# Patient Record
Sex: MALE | Race: WHITE | NOT HISPANIC OR LATINO | Employment: FULL TIME | URBAN - METROPOLITAN AREA
[De-identification: names, ages, dates, MRNs, and addresses within clinical notes are randomized per-mention and may not be internally consistent; named-entity substitution may affect disease eponyms.]

---

## 2023-11-22 ENCOUNTER — HOSPITAL ENCOUNTER (EMERGENCY)
Facility: HOSPITAL | Age: 38
Discharge: HOME | End: 2023-11-22
Attending: EMERGENCY MEDICINE | Admitting: EMERGENCY MEDICINE
Payer: COMMERCIAL

## 2023-11-22 VITALS
BODY MASS INDEX: 35.7 KG/M2 | HEART RATE: 112 BPM | RESPIRATION RATE: 16 BRPM | OXYGEN SATURATION: 97 % | HEIGHT: 71 IN | TEMPERATURE: 98 F | WEIGHT: 255 LBS | DIASTOLIC BLOOD PRESSURE: 141 MMHG | SYSTOLIC BLOOD PRESSURE: 201 MMHG

## 2023-11-22 DIAGNOSIS — I10 ELEVATED BLOOD PRESSURE READING WITH DIAGNOSIS OF HYPERTENSION: Primary | ICD-10-CM

## 2023-11-22 LAB
ALBUMIN SERPL-MCNC: 4.7 G/DL (ref 3.5–5.2)
ALBUMIN/GLOB SERPL: 1.3 G/DL
ALP SERPL-CCNC: 104 U/L (ref 39–117)
ALT SERPL W P-5'-P-CCNC: 203 U/L (ref 1–41)
ANION GAP SERPL CALCULATED.3IONS-SCNC: 11 MMOL/L (ref 5–15)
AST SERPL-CCNC: 96 U/L (ref 1–40)
BACTERIA UR QL AUTO: NORMAL /HPF
BASOPHILS # BLD AUTO: 0.12 10*3/MM3 (ref 0–0.2)
BASOPHILS NFR BLD AUTO: 1.2 % (ref 0–1.5)
BILIRUB SERPL-MCNC: 0.7 MG/DL (ref 0–1.2)
BILIRUB UR QL STRIP: NEGATIVE
BUN SERPL-MCNC: 10 MG/DL (ref 6–20)
BUN/CREAT SERPL: 11.1 (ref 7–25)
CALCIUM SPEC-SCNC: 9.5 MG/DL (ref 8.6–10.5)
CHLORIDE SERPL-SCNC: 99 MMOL/L (ref 98–107)
CLARITY UR: CLEAR
CO2 SERPL-SCNC: 27 MMOL/L (ref 22–29)
COLOR UR: YELLOW
CREAT SERPL-MCNC: 0.9 MG/DL (ref 0.76–1.27)
DEPRECATED RDW RBC AUTO: 41.9 FL (ref 37–54)
EGFRCR SERPLBLD CKD-EPI 2021: 112.8 ML/MIN/1.73
EOSINOPHIL # BLD AUTO: 0.2 10*3/MM3 (ref 0–0.4)
EOSINOPHIL NFR BLD AUTO: 2.1 % (ref 0.3–6.2)
ERYTHROCYTE [DISTWIDTH] IN BLOOD BY AUTOMATED COUNT: 12.1 % (ref 12.3–15.4)
GLOBULIN UR ELPH-MCNC: 3.6 GM/DL
GLUCOSE SERPL-MCNC: 145 MG/DL (ref 65–99)
GLUCOSE UR STRIP-MCNC: NEGATIVE MG/DL
HCT VFR BLD AUTO: 51.8 % (ref 37.5–51)
HGB BLD-MCNC: 18 G/DL (ref 13–17.7)
HGB UR QL STRIP.AUTO: NEGATIVE
HYALINE CASTS UR QL AUTO: NORMAL /LPF
IMM GRANULOCYTES # BLD AUTO: 0.13 10*3/MM3 (ref 0–0.05)
IMM GRANULOCYTES NFR BLD AUTO: 1.3 % (ref 0–0.5)
KETONES UR QL STRIP: NEGATIVE
LEUKOCYTE ESTERASE UR QL STRIP.AUTO: NEGATIVE
LYMPHOCYTES # BLD AUTO: 1.86 10*3/MM3 (ref 0.7–3.1)
LYMPHOCYTES NFR BLD AUTO: 19.2 % (ref 19.6–45.3)
MCH RBC QN AUTO: 33 PG (ref 26.6–33)
MCHC RBC AUTO-ENTMCNC: 34.7 G/DL (ref 31.5–35.7)
MCV RBC AUTO: 94.9 FL (ref 79–97)
MONOCYTES # BLD AUTO: 0.79 10*3/MM3 (ref 0.1–0.9)
MONOCYTES NFR BLD AUTO: 8.1 % (ref 5–12)
NEUTROPHILS NFR BLD AUTO: 6.6 10*3/MM3 (ref 1.7–7)
NEUTROPHILS NFR BLD AUTO: 68.1 % (ref 42.7–76)
NITRITE UR QL STRIP: NEGATIVE
NRBC BLD AUTO-RTO: 0 /100 WBC (ref 0–0.2)
PH UR STRIP.AUTO: 7 [PH] (ref 5–8)
PLATELET # BLD AUTO: 248 10*3/MM3 (ref 140–450)
PMV BLD AUTO: 10.2 FL (ref 6–12)
POTASSIUM SERPL-SCNC: 3.8 MMOL/L (ref 3.5–5.2)
PROT SERPL-MCNC: 8.3 G/DL (ref 6–8.5)
PROT UR QL STRIP: ABNORMAL
QT INTERVAL: 340 MS
QTC INTERVAL: 422 MS
RBC # BLD AUTO: 5.46 10*6/MM3 (ref 4.14–5.8)
RBC # UR STRIP: NORMAL /HPF
REF LAB TEST METHOD: NORMAL
SODIUM SERPL-SCNC: 137 MMOL/L (ref 136–145)
SP GR UR STRIP: 1.02 (ref 1–1.03)
SQUAMOUS #/AREA URNS HPF: NORMAL /HPF
UROBILINOGEN UR QL STRIP: ABNORMAL
WBC # UR STRIP: NORMAL /HPF
WBC NRBC COR # BLD AUTO: 9.7 10*3/MM3 (ref 3.4–10.8)

## 2023-11-22 PROCEDURE — 36415 COLL VENOUS BLD VENIPUNCTURE: CPT

## 2023-11-22 PROCEDURE — 80053 COMPREHEN METABOLIC PANEL: CPT | Performed by: EMERGENCY MEDICINE

## 2023-11-22 PROCEDURE — 99283 EMERGENCY DEPT VISIT LOW MDM: CPT

## 2023-11-22 PROCEDURE — 93005 ELECTROCARDIOGRAM TRACING: CPT | Performed by: EMERGENCY MEDICINE

## 2023-11-22 PROCEDURE — 85025 COMPLETE CBC W/AUTO DIFF WBC: CPT | Performed by: EMERGENCY MEDICINE

## 2023-11-22 PROCEDURE — 25010000002 HYDRALAZINE PER 20 MG: Performed by: EMERGENCY MEDICINE

## 2023-11-22 PROCEDURE — 81001 URINALYSIS AUTO W/SCOPE: CPT | Performed by: EMERGENCY MEDICINE

## 2023-11-22 PROCEDURE — 96372 THER/PROPH/DIAG INJ SC/IM: CPT

## 2023-11-22 RX ORDER — HYDRALAZINE HYDROCHLORIDE 20 MG/ML
20 INJECTION INTRAMUSCULAR; INTRAVENOUS ONCE
Status: COMPLETED | OUTPATIENT
Start: 2023-11-22 | End: 2023-11-22

## 2023-11-22 RX ORDER — HYDROCHLOROTHIAZIDE 25 MG/1
25 TABLET ORAL DAILY
Qty: 30 TABLET | Refills: 0 | Status: SHIPPED | OUTPATIENT
Start: 2023-11-22 | End: 2023-12-22

## 2023-11-22 RX ADMIN — HYDRALAZINE HYDROCHLORIDE 20 MG: 20 INJECTION INTRAMUSCULAR; INTRAVENOUS at 13:50

## 2023-11-22 NOTE — ED PROVIDER NOTES
Subjective   History of Present Illness  38-year-old male presents for evaluation of elevated blood pressure readings.  Of note, the patient had not seen a physician for approximately 8 years until 2 days ago when he saw his primary care physician.  He was noted to be markedly hypertensive and was started on amlodipine for presumptive hypertension.  However, he continues to note elevated blood pressure readings at home, prompting his visit to the emergency department here for a second opinion today.  The patient is completely asymptomatic from a blood pressure standpoint.  He denies any chest pain.  He denies any shortness of breath.  Normal urination.  He denies any headache or dizziness.  He currently feels well and has no complaints.      Review of Systems   Neurological:  Negative for dizziness and headaches.   All other systems reviewed and are negative.      No past medical history on file.    No Known Allergies    No past surgical history on file.    No family history on file.    Social History     Socioeconomic History    Marital status: Single           Objective   Physical Exam  Vitals and nursing note reviewed.   Constitutional:       General: He is not in acute distress.     Appearance: He is well-developed. He is not diaphoretic.      Comments: Well-appearing male in no acute distress   HENT:      Head: Normocephalic and atraumatic.   Eyes:      Pupils: Pupils are equal, round, and reactive to light.   Neck:      Vascular: No JVD.   Cardiovascular:      Rate and Rhythm: Normal rate and regular rhythm.      Heart sounds: Normal heart sounds. No murmur heard.     No friction rub. No gallop.   Pulmonary:      Effort: Pulmonary effort is normal. No respiratory distress.      Breath sounds: Normal breath sounds. No wheezing or rales.   Abdominal:      General: Bowel sounds are normal. There is no distension.      Palpations: Abdomen is soft. There is no mass.      Tenderness: There is no abdominal  tenderness. There is no guarding.   Musculoskeletal:         General: Normal range of motion.      Cervical back: Normal range of motion.   Skin:     General: Skin is warm and dry.      Coloration: Skin is not pale.      Findings: No erythema or rash.   Neurological:      Mental Status: He is alert and oriented to person, place, and time.      Comments: Awake, alert, and oriented x3, clear and fluent speech, no ataxia or dysmetria, normal gait, neurovascularly intact distally in all fours with bounding distal pulses and normal sensation, 5 out of 5 strength in all fours, no cranial nerve deficits noted with cranial nerves II through XII grossly intact   Psychiatric:         Thought Content: Thought content normal.         Judgment: Judgment normal.         Procedures           ED Course  ED Course as of 11/22/23 1702 Wed Nov 22, 2023   1322 38-year-old male presents for evaluation of elevated blood pressure readings.  Of note, the patient had not seen a physician for approximately 8 years until 2 days ago when he saw his primary care physician.  He was noted to be markedly hypertensive and was started on amlodipine for presumptive hypertension.  However, he continues to experience elevated blood pressure readings at home, prompting his visit to the emergency department here today.  On arrival, the patient is hypertensive but completely asymptomatic.  He denies any headaches.  No dizziness.  No chest pain.  EKG revealed normal sinus rhythm with a heart rate of 93 and no ST segments suggestive of or concerning for ischemia without any evidence of LVH noted.  We will obtain labs to assess for endorgan damage and will reassess following initial interventions. [DD]   1701 Labs are bland/unrevealing.  No evidence of endorgan damage.  The patient is completely asymptomatic. [DD]   1701  The patient is currently asymptomatic from a blood pressure standpoint, and their clinical presentation clearly is not consistent with  hypertensive emergency or hypertensive encephalopathy.  No indication to further emergently lower the patient's blood pressure at this time per [facility]'s clinical policy on asymptomatic hypertension.  Over the next 3 days, the patient will keep close tabs on their blood pressure and will keep a log of readings 2-3 times per day.  They will then follow-up with their primary care physician to discuss potential tweaks to their medication regimen.     [DD]   1702 Given the patient's consistently elevated readings, we will add on hydrochlorothiazide to his current amlodipine regimen.  He will follow-up with his primary care physician within the next week.  Agreeable with plan and given appropriate strict return precautions. [DD]      ED Course User Index  [DD] Damaso Reeves MD                                      Recent Results (from the past 24 hour(s))   ECG 12 Lead Other; HTN    Collection Time: 11/22/23 12:51 PM   Result Value Ref Range    QT Interval 340 ms    QTC Interval 422 ms   Comprehensive Metabolic Panel    Collection Time: 11/22/23 12:52 PM    Specimen: Blood   Result Value Ref Range    Glucose 145 (H) 65 - 99 mg/dL    BUN 10 6 - 20 mg/dL    Creatinine 0.90 0.76 - 1.27 mg/dL    Sodium 137 136 - 145 mmol/L    Potassium 3.8 3.5 - 5.2 mmol/L    Chloride 99 98 - 107 mmol/L    CO2 27.0 22.0 - 29.0 mmol/L    Calcium 9.5 8.6 - 10.5 mg/dL    Total Protein 8.3 6.0 - 8.5 g/dL    Albumin 4.7 3.5 - 5.2 g/dL    ALT (SGPT) 203 (H) 1 - 41 U/L    AST (SGOT) 96 (H) 1 - 40 U/L    Alkaline Phosphatase 104 39 - 117 U/L    Total Bilirubin 0.7 0.0 - 1.2 mg/dL    Globulin 3.6 gm/dL    A/G Ratio 1.3 g/dL    BUN/Creatinine Ratio 11.1 7.0 - 25.0    Anion Gap 11.0 5.0 - 15.0 mmol/L    eGFR 112.8 >60.0 mL/min/1.73   CBC Auto Differential    Collection Time: 11/22/23 12:52 PM    Specimen: Blood   Result Value Ref Range    WBC 9.70 3.40 - 10.80 10*3/mm3    RBC 5.46 4.14 - 5.80 10*6/mm3    Hemoglobin 18.0 (H) 13.0 - 17.7 g/dL     Hematocrit 51.8 (H) 37.5 - 51.0 %    MCV 94.9 79.0 - 97.0 fL    MCH 33.0 26.6 - 33.0 pg    MCHC 34.7 31.5 - 35.7 g/dL    RDW 12.1 (L) 12.3 - 15.4 %    RDW-SD 41.9 37.0 - 54.0 fl    MPV 10.2 6.0 - 12.0 fL    Platelets 248 140 - 450 10*3/mm3    Neutrophil % 68.1 42.7 - 76.0 %    Lymphocyte % 19.2 (L) 19.6 - 45.3 %    Monocyte % 8.1 5.0 - 12.0 %    Eosinophil % 2.1 0.3 - 6.2 %    Basophil % 1.2 0.0 - 1.5 %    Immature Grans % 1.3 (H) 0.0 - 0.5 %    Neutrophils, Absolute 6.60 1.70 - 7.00 10*3/mm3    Lymphocytes, Absolute 1.86 0.70 - 3.10 10*3/mm3    Monocytes, Absolute 0.79 0.10 - 0.90 10*3/mm3    Eosinophils, Absolute 0.20 0.00 - 0.40 10*3/mm3    Basophils, Absolute 0.12 0.00 - 0.20 10*3/mm3    Immature Grans, Absolute 0.13 (H) 0.00 - 0.05 10*3/mm3    nRBC 0.0 0.0 - 0.2 /100 WBC   Urinalysis With Culture If Indicated - Urine, Clean Catch    Collection Time: 11/22/23 12:53 PM    Specimen: Urine, Clean Catch   Result Value Ref Range    Color, UA Yellow Yellow, Straw    Appearance, UA Clear Clear    pH, UA 7.0 5.0 - 8.0    Specific Gravity, UA 1.018 1.001 - 1.030    Glucose, UA Negative Negative    Ketones, UA Negative Negative    Bilirubin, UA Negative Negative    Blood, UA Negative Negative    Protein, UA 30 mg/dL (1+) (A) Negative    Leuk Esterase, UA Negative Negative    Nitrite, UA Negative Negative    Urobilinogen, UA 1.0 E.U./dL 0.2 - 1.0 E.U./dL   Urinalysis, Microscopic Only - Urine, Clean Catch    Collection Time: 11/22/23 12:53 PM    Specimen: Urine, Clean Catch   Result Value Ref Range    RBC, UA 0-2 None Seen, 0-2 /HPF    WBC, UA 0-2 None Seen, 0-2 /HPF    Bacteria, UA None Seen None Seen, Trace /HPF    Squamous Epithelial Cells, UA None Seen None Seen, 0-2 /HPF    Hyaline Casts, UA None Seen 0 - 6 /LPF    Methodology Automated Microscopy      Note: In addition to lab results from this visit, the labs listed above may include labs taken at another facility or during a different encounter within the last 24  "hours. Please correlate lab times with ED admission and discharge times for further clarification of the services performed during this visit.    No orders to display     Vitals:    11/22/23 1126   BP: (!) 201/141   BP Location: Left arm   Patient Position: Sitting   Pulse: 112   Resp: 16   Temp: 98 °F (36.7 °C)   TempSrc: Oral   SpO2: 97%   Weight: 116 kg (255 lb)   Height: 180.3 cm (71\")     Medications   hydrALAZINE (APRESOLINE) injection 20 mg (20 mg Intramuscular Given 11/22/23 1350)     ECG/EMG Results (last 24 hours)       ** No results found for the last 24 hours. **          ECG 12 Lead Other; HTN   Final Result   Test Reason : Other~   Blood Pressure :   */*   mmHG   Vent. Rate :  93 BPM     Atrial Rate :  93 BPM      P-R Int : 158 ms          QRS Dur : 100 ms       QT Int : 340 ms       P-R-T Axes :  34  11  74 degrees      QTc Int : 422 ms      Normal sinus rhythm   Possible Left atrial enlargement   Borderline ECG   No previous ECGs available   Confirmed by MD Reeves Michael ([id number]) on 11/22/2023 7:32:38 PM      Referred By: BALTAZAR           Confirmed By: Ben Reeves MD                 Medical Decision Making  Problems Addressed:  Elevated blood pressure reading with diagnosis of hypertension: complicated acute illness or injury    Amount and/or Complexity of Data Reviewed  Labs: ordered.  ECG/medicine tests: ordered.    Risk  Prescription drug management.        Final diagnoses:   Elevated blood pressure reading with diagnosis of hypertension       ED Disposition  ED Disposition       ED Disposition   Discharge    Condition   Stable    Comment   --               PATIENT CONNECTION - [facility] [address]  [phone]  In 1 week           Medication List        New Prescriptions      hydroCHLOROthiazide 25 MG tablet  Commonly known as: HYDRODIURIL  Take 1 tablet by mouth Daily for 30 days.               Where to Get Your Medications        These medications were sent to [facility] " #[id number] - [place], [place] - [address]  AT [address] - [phone] PH - [phone] FX  [address]  [address], [facility] [address]      Phone: [phone]   hydroCHLOROthiazide 25 MG tablet            Damaso Reeves MD  11/22/23 212

## 2023-12-21 NOTE — PROGRESS NOTES
[facility] Cardiology  Consultation H&P  Dandre Borges  1985 [address] [place] [address]     VISIT DATE:  12/22/23    PCP: Irineo Khan MD  [address] [facility] [address]    IDENTIFICATION: A 38 y.o. male works in [employer] graduate    PROBLEM LIST:  Hypertension  Hyperlipidemia  11/23  303/162/51/220  Prediabetes  11/23 A1c 6.2  Polycythemia--Hgb 18.3, HCT 52.1 (11/2023)  Elevated transaminases-,         CC:  Chief Complaint   Patient presents with    Hypertension       Allergies  No Known Allergies    Current Medications  Current Outpatient Medications   Medication Instructions    amLODIPine (NORVASC) 5 mg, Oral, Daily    hydroCHLOROthiazide (HYDRODIURIL) 25 mg, Oral, Daily        History of Present Illness   HPI  Dandre Borges is a 38 y.o. year old male with the above mentioned PMH who presents for consult from Irineo Khan MD for evaluation of hypertension.  Patient has not been to a doctor in over 5 years and was evaluated in November and found to have HTN, 200/110.  He underwent evaluation in [facility] ER in November.  He was not felt to have any endorgan damage and was released with the addition of hydrochlorothiazide.  CBC revealed polycythemia, CMP elevated ALT/AST.  He does report prior to November he was drinking 5-6 beers and/or liquor per day, 5 to 6 days a week.  He has stopped drinking and denies any symptoms of withdrawal.  He checks home blood pressures and readings are 140s/100-110.  Labs from PCP revealed LDL of 220, A1c 6.2.  He is making lifestyle changes and does not wish to take medications at this time.  He has been told he snores and has apneic episodes while sleeping.  He has not had formal sleep evaluation.  He denies any significant family history of hypertension and hyperlipidemia.    Pt denies any chest pain, dyspnea at rest, dyspnea on exertion, orthopnea, PND,  "palpitations, lower extremity edema, or claudication. Pt denies history of CHF, DVT, PE, MI, CVA, TIA, or rheumatic fever.       ROS: All systems have been reviewed and are negative with the exception of those mentioned in the HPI and problem list above.    SOCIAL HX  Social History     Socioeconomic History    Marital status: Single   Tobacco Use    Smoking status: Never    Smokeless tobacco: Current     Types: Chew   Vaping Use    Vaping Use: Never used   Substance and Sexual Activity    Alcohol use: Yes     Comment: occas    Drug use: Never    Sexual activity: Defer       FAMILY HX  Family History   Problem Relation Age of Onset    No Known Problems Mother     No Known Problems Father     No Known Problems Sister     No Known Problems Brother        Vitals:    12/22/23 0956   BP: 162/100   BP Location: Right arm   Patient Position: Sitting   Pulse: 82   SpO2: 98%   Weight: 113 kg (248 lb 9.6 oz)   Height: 180.3 cm (71\")     Body mass index is 34.67 kg/m².     PHYSICAL EXAMINATION:  Vitals reviewed.   Constitutional:       Appearance: Normal appearance. Well-developed and not in distress.   Neck:      Vascular: No JVD.   Pulmonary:      Effort: Pulmonary effort is normal.      Breath sounds: Normal breath sounds.   Cardiovascular:      Normal rate. Regular rhythm.      Murmurs: There is no murmur.      No gallop.  No rub.   Pulses:     Intact distal pulses.   Edema:     Peripheral edema absent.   Neurological:      Mental Status: Alert.   Psychiatric:         Behavior: Behavior is cooperative.         Diagnostic Data:  Procedures  11/22/2023  EKG reviewed, NSR, possible left atrial enlargement, rate 93    Labs:    Lipid: 303/162/51/220, 11/20/2023  Lab Results   Component Value Date    GLUCOSE 145 (H) 11/22/2023    CALCIUM 9.5 11/22/2023     11/22/2023    K 3.8 11/22/2023    CO2 27.0 11/22/2023    CL 99 11/22/2023    BUN 10 11/22/2023    CREATININE 0.90 11/22/2023    EGFR 112.8 11/22/2023    BCR 11.1 " 11/22/2023    ANIONGAP 11.0 11/22/2023     CMP: , BUN 10, CR 1.12, , K4.0, ,  -  11/20/2023    A1c 6.2, 11/20/2023    Lab Results   Component Value Date    WBC 9.70 11/22/2023    HGB 18.0 (H) 11/22/2023    HCT 51.8 (H) 11/22/2023    MCV 94.9 11/22/2023     11/22/2023   TSH 2.385, 11/20/2023    Advance Care Planning   ACP discussion was declined by the patient. Patient does not have an advance directive, declines further assistance.         ASSESSMENT:   Diagnosis Plan   1. Primary hypertension  Adult Transthoracic Echo Complete W/ Cont if Necessary Per Protocol    Ambulatory Referral to Sleep Medicine      2. Snoring  Adult Transthoracic Echo Complete W/ Cont if Necessary Per Protocol    Ambulatory Referral to Sleep Medicine      3. Suspected sleep apnea  Adult Transthoracic Echo Complete W/ Cont if Necessary Per Protocol    Ambulatory Referral to Sleep Medicine      4. Hyperlipidemia LDL goal <100            PLAN:  We will alter amlodipine to Exforge 5/160, one tablet by mouth daily.  Target blood pressure to less than 130/80.  Check echocardiogram to assess for LVH.  We will set up for overnight oximetry and go ahead and refer for sleep evaluation at [facility].  , A1c 6.2 without significant family history of heart disease. We discussed diet modification, exercise, and weight loss with repeat labs per primary care. He is motivated to make these changes.         Irineo Khan MD, thank you for referring Mr. Borges for evaluation.  I have forwarded my electronically generated recommendations to you for review.  Please do not hesitate to call with any questions.    Adriana Irwin, APRN 12/22/23 10:31 EST    Brett Maldonado MD, [facility]

## 2023-12-22 ENCOUNTER — OFFICE VISIT (OUTPATIENT)
Dept: CARDIOLOGY | Facility: CLINIC | Age: 38
End: 2023-12-22
Payer: COMMERCIAL

## 2023-12-22 ENCOUNTER — TELEPHONE (OUTPATIENT)
Dept: CARDIOLOGY | Facility: CLINIC | Age: 38
End: 2023-12-22

## 2023-12-22 VITALS
WEIGHT: 248.6 LBS | OXYGEN SATURATION: 98 % | HEART RATE: 82 BPM | SYSTOLIC BLOOD PRESSURE: 162 MMHG | BODY MASS INDEX: 34.8 KG/M2 | DIASTOLIC BLOOD PRESSURE: 100 MMHG | HEIGHT: 71 IN

## 2023-12-22 DIAGNOSIS — R06.83 SNORING: ICD-10-CM

## 2023-12-22 DIAGNOSIS — E78.5 HYPERLIPIDEMIA LDL GOAL <100: ICD-10-CM

## 2023-12-22 DIAGNOSIS — R29.818 SUSPECTED SLEEP APNEA: ICD-10-CM

## 2023-12-22 DIAGNOSIS — I10 PRIMARY HYPERTENSION: Primary | ICD-10-CM

## 2023-12-22 RX ORDER — AMLODIPINE AND VALSARTAN 5; 160 MG/1; MG/1
1 TABLET ORAL DAILY
Qty: 30 TABLET | Refills: 11 | Status: SHIPPED | OUTPATIENT
Start: 2023-12-22 | End: 2024-12-21

## 2023-12-22 RX ORDER — AMLODIPINE BESYLATE 5 MG/1
5 TABLET ORAL DAILY
COMMUNITY
End: 2023-12-22 | Stop reason: ALTCHOICE

## 2023-12-22 NOTE — TELEPHONE ENCOUNTER
Caller: Dandre Borges    Relationship: Self    Best call back number: [phone]    What is the best time to reach you: ANY     Who are you requesting to speak with (clinical staff, provider,  specific staff member): CLINICAL    What was the call regarding: PT IS INQUIRING IF HE SHOULD CONTINUE TAKING HIS HYDROCHLOROTHIAZIDE. PLEASE REACH OUT TO FURTHER ADVISE. THANK YOU!     Is it okay if the provider responds through [facility]: PLEASE CALL

## 2023-12-26 RX ORDER — HYDROCHLOROTHIAZIDE 25 MG/1
25 TABLET ORAL DAILY
Qty: 90 TABLET | Refills: 1 | Status: SHIPPED | OUTPATIENT
Start: 2023-12-26

## 2023-12-26 NOTE — TELEPHONE ENCOUNTER
Called pt, advised pt to continue HCTZ. Pt verbalizes understanding and agreeable to plan. Pt requests refill, refill sent.

## 2024-01-12 ENCOUNTER — LAB (OUTPATIENT)
Dept: LAB | Facility: HOSPITAL | Age: 39
End: 2024-01-12
Payer: COMMERCIAL

## 2024-01-12 ENCOUNTER — TRANSCRIBE ORDERS (OUTPATIENT)
Dept: LAB | Facility: HOSPITAL | Age: 39
End: 2024-01-12
Payer: COMMERCIAL

## 2024-01-12 DIAGNOSIS — D75.1 POLYCYTHEMIA, SECONDARY: ICD-10-CM

## 2024-01-12 DIAGNOSIS — R73.9 BLOOD GLUCOSE ELEVATED: ICD-10-CM

## 2024-01-12 DIAGNOSIS — R79.89 HYPOURICEMIA: ICD-10-CM

## 2024-01-12 DIAGNOSIS — R79.89 HYPOURICEMIA: Primary | ICD-10-CM

## 2024-01-12 LAB
GLUCOSE 1H P 100 G GLC PO SERPL-MCNC: 175 MG/DL (ref 74–180)
GLUCOSE 2H P 100 G GLC PO SERPL-MCNC: 108 MG/DL (ref 74–155)
GLUCOSE 3H P 100 G GLC PO SERPL-MCNC: 81 MG/DL (ref 74–140)
GLUCOSE BLDC GLUCOMTR-MCNC: 124 MG/DL (ref 70–130)
GLUCOSE P FAST SERPL-MCNC: 133 MG/DL (ref 74–106)
HAV IGM SERPL QL IA: NORMAL
HBV CORE IGM SERPL QL IA: NORMAL
HBV SURFACE AG SERPL QL IA: NORMAL
HCV AB SER DONR QL: NORMAL

## 2024-01-12 PROCEDURE — 80074 ACUTE HEPATITIS PANEL: CPT

## 2024-01-12 PROCEDURE — 36415 COLL VENOUS BLD VENIPUNCTURE: CPT

## 2024-01-12 PROCEDURE — 82952 GTT-ADDED SAMPLES: CPT

## 2024-01-12 PROCEDURE — 82951 GLUCOSE TOLERANCE TEST (GTT): CPT

## 2024-01-12 PROCEDURE — 82668 ASSAY OF ERYTHROPOIETIN: CPT

## 2024-01-15 LAB — EPO SERPL-ACNC: 8 MIU/ML (ref 2.6–18.5)

## 2024-01-16 LAB — JAK2 V617F RESULT: NORMAL

## 2024-03-11 NOTE — PROGRESS NOTES
Sleep Clinic Video Visit Consult Note    The patient is located at their home address in [place], [place]. The patient presents today for telehealth service.  This service was conducted via audio/video technology through a secure [employer] video visit connection through Epic.  This provider is located in [facility].  Patient stated they are in a secure environment for the session.  Patient's condition being diagnosed/treated is appropriate for telemedicine.  The provider identified himself as well as his credentials.  The patient, and/or patient's guardian, consent to be seen remotely, and when consent is given they understanding that the consent allows for patient identifiable information to be sent to a third-party as needed.  They may refuse to be seen remotely at any time.  The electronic data is encrypted and password protected, and the patient and/or guardian has been advised of the potential risk to privacy not withstanding such measures.  Patient identifiers used: Name and date of birth.     You have chosen to receive care through a telehealth visit.  Do you consent to use a video/audio connection for your medical care today? Yes     Chief Complaint  Sleep problem    Subjective     History of Present Illness:  Dandre Borges is a 38 y.o. male with a history of hypertension, hyperlipidemia, prediabetes, polycythemia, and elevated transaminase.  The patient is referred by Brett Maldonado MD with cardiology.  He has had difficulty with snoring, and controlling hypertension.  Overnight oximetry study was obtained on 12/26/2023 which was concerning for undiagnosed sleep apnea. He sleeps late during the weekends.    Further details are as follows:    [place] Scale is (out of 24): Total score: 9     Estimated average amount of sleep per night: 5-6 hours  Number of times he wakes up at night: 3-4 times  Difficulty falling back asleep: yes  It usually takes 60 minutes to go to sleep.  He feels sleepy  "upon waking up: yes  Rotating or night shift work: no    Drowsiness/Sleepiness:  He exhibits the following:  excessive daytime sleepiness  excessive daytime fatigue  falls asleep watching TV  difficulty driving due to sleepiness  Occasional naps    Snoring/Breathing:  He exhibits the following:  loud snoring  snores in all sleep positions  awoken with dry mouth  quits breathing at night    Head Injury:  He exhibits the following:  No    Reflux:  He describes the following:  wakes up at night with a sour taste or burning sensation in chest- 1-2 times per month    Narcolepsy:  He exhibits the following:  none    RLS/PLMs:  He describes the following:  none    Insomnia:  He describes the following:  problems initiating sleep at night  frequent awakenings    Parasomnia:  He exhibits the following:  sleep talks    Weight:  Weight change in the last year:  gain: 0 lbs    The patient's relevant past medical, surgical, family, and social history reviewed and updated in Epic as appropriate.    Review of Systems    Objective   Vital Signs:  Ht 177.8 cm (70\")   Wt 116 kg (255 lb)   BMI 36.59 kg/m²     BMI is >= 30 and <35. (Class 1 Obesity). The following options were offered after discussion;: working on diet and exercise.        Physical Exam  Constitutional:       Appearance: Normal appearance.   Neurological:      Mental Status: He is alert and oriented to person, place, and time.   Psychiatric:         Mood and Affect: Mood normal.         Behavior: Behavior normal.         Thought Content: Thought content normal.         Judgment: Judgment normal.             Result Review :              Assessment and Plan  Dandre Borges is a 38 y.o. male with a past medical history of hypertension, hyperlipidemia, prediabetes, polycythemia, and elevated transaminase who presents for further evaluation of excessive daytime sleepiness and fatigue, nonrestorative sleep, and concerns for sleep disordered breathing and " obstructive sleep apnea. The patient's symptoms, particularly snoring, are concerning for significant sleep disordered breathing and obstructive sleep apnea. We will obtain a home sleep test for further evaluation.  The patient will return for follow-up and recommendations after test.  I have discussed weight loss as it pertains to obstructive sleep apnea.    Diagnoses and all orders for this visit:    1. Snoring (Primary)  -     Home Sleep Study; Future    2. Suspected sleep apnea  -     Home Sleep Study; Future    3. Excessive daytime sleepiness  -     Home Sleep Study; Future    4. Primary hypertension                 I discussed the consequences of uncontrolled sleep apnea including hypertension, heart disease, diabetes, stroke, and dementia. I further discussed sleep apnea therapeutic options including CPAP, Weight loss, Oral dental appliance, and surgery.         Follow Up  Return for Follow up after study.  Patient was given instructions and counseling regarding his condition or for health maintenance advice. Please see specific information pulled into the AVS if appropriate.     ASPEN Cerna, ACNP-BC  Pulmonary, Critical Care Medicine, and Sleep Medicine

## 2024-03-14 ENCOUNTER — TELEMEDICINE (OUTPATIENT)
Dept: SLEEP MEDICINE | Facility: HOSPITAL | Age: 39
End: 2024-03-14
Payer: COMMERCIAL

## 2024-03-14 VITALS — HEIGHT: 70 IN | BODY MASS INDEX: 36.51 KG/M2 | WEIGHT: 255 LBS

## 2024-03-14 DIAGNOSIS — R06.83 SNORING: Primary | ICD-10-CM

## 2024-03-14 DIAGNOSIS — G47.19 EXCESSIVE DAYTIME SLEEPINESS: ICD-10-CM

## 2024-03-14 DIAGNOSIS — R29.818 SUSPECTED SLEEP APNEA: ICD-10-CM

## 2024-03-14 DIAGNOSIS — I10 PRIMARY HYPERTENSION: ICD-10-CM

## 2024-04-05 ENCOUNTER — HOSPITAL ENCOUNTER (OUTPATIENT)
Dept: SLEEP MEDICINE | Facility: HOSPITAL | Age: 39
Discharge: HOME | End: 2024-04-05
Admitting: NURSE PRACTITIONER
Payer: COMMERCIAL

## 2024-04-05 VITALS — HEIGHT: 70 IN | BODY MASS INDEX: 36.51 KG/M2 | WEIGHT: 255 LBS

## 2024-04-05 DIAGNOSIS — R06.83 SNORING: ICD-10-CM

## 2024-04-05 DIAGNOSIS — R29.818 SUSPECTED SLEEP APNEA: ICD-10-CM

## 2024-04-05 DIAGNOSIS — G47.19 EXCESSIVE DAYTIME SLEEPINESS: ICD-10-CM

## 2024-04-05 PROCEDURE — 95800 SLP STDY UNATTENDED: CPT

## 2024-04-09 DIAGNOSIS — G47.33 OSA (OBSTRUCTIVE SLEEP APNEA): Primary | ICD-10-CM

## 2024-05-31 ENCOUNTER — TELEPHONE (OUTPATIENT)
Dept: CARDIOLOGY | Facility: CLINIC | Age: 39
End: 2024-05-31
Payer: COMMERCIAL

## 2024-05-31 NOTE — TELEPHONE ENCOUNTER
Caller: Dandre Borges    Relationship to patient: Self    Best call back number: [phone]    Chief complaint: PATIENT WAS CALLING IN TODAY TO SCHEDULE A FOLLOW UP APPOINTMENT. HE WOULD LIKE THAT TO BE A TELEHEALTH VISIT. HE WOULD ALSO LIKE TO SPEAK TO SOMEONE ABOUT TEST RESULTS FROM AN ECHO DONE 12.22.23. PLEASE REACH OUT TO THE PATIENT TO ADDRESS THESES CONCERNS.     Type of visit: FOLLOW UP    Requested date: DECEMBER       Additional notes:

## 2024-06-03 ENCOUNTER — TELEPHONE (OUTPATIENT)
Dept: CARDIOLOGY | Facility: CLINIC | Age: 39
End: 2024-06-03
Payer: COMMERCIAL

## 2024-06-03 NOTE — TELEPHONE ENCOUNTER
Reached out to pt, let him know I've requested his Echo results from [facility]  [phone]. Pt also needs annual appt with [facility] and I put him in touch with our . No further questions concerns. Pt aware that once echo results reviewed we will call with results . Verbalized understanding .

## 2024-06-07 ENCOUNTER — TELEPHONE (OUTPATIENT)
Dept: CARDIOLOGY | Facility: CLINIC | Age: 39
End: 2024-06-07
Payer: COMMERCIAL

## 2024-06-07 NOTE — TELEPHONE ENCOUNTER
Left message of echo results ,WNL. Check blood pressures , for better blood pressure control. No changes necessary .

## 2025-02-21 RX ORDER — AMLODIPINE AND VALSARTAN 5; 160 MG/1; MG/1
1 TABLET ORAL DAILY
Qty: 30 TABLET | Refills: 0 | Status: SHIPPED | OUTPATIENT
Start: 2025-02-21 | End: 2026-02-21

## 2025-03-26 RX ORDER — AMLODIPINE AND VALSARTAN 5; 160 MG/1; MG/1
1 TABLET ORAL DAILY
Qty: 30 TABLET | Refills: 0 | OUTPATIENT
Start: 2025-03-26 | End: 2026-03-26